# Patient Record
Sex: MALE | Race: WHITE | NOT HISPANIC OR LATINO | Employment: FULL TIME | URBAN - METROPOLITAN AREA
[De-identification: names, ages, dates, MRNs, and addresses within clinical notes are randomized per-mention and may not be internally consistent; named-entity substitution may affect disease eponyms.]

---

## 2023-06-08 ENCOUNTER — OFFICE VISIT (OUTPATIENT)
Dept: PRIMARY CARE | Facility: CLINIC | Age: 68
End: 2023-06-08
Payer: MEDICARE

## 2023-06-08 DIAGNOSIS — H61.23 BILATERAL IMPACTED CERUMEN: Primary | ICD-10-CM

## 2023-06-08 PROCEDURE — 69210 REMOVE IMPACTED EAR WAX UNI: CPT | Performed by: INTERNAL MEDICINE

## 2023-06-08 PROCEDURE — 1036F TOBACCO NON-USER: CPT | Performed by: INTERNAL MEDICINE

## 2023-06-08 NOTE — PROGRESS NOTES
Patient ID: Douglas Cheadle is a 68 y.o. male.    Presents for periodic ear cerumen removal  Initial irrigation procedure performed by Kaitlynn Black MA  Curette removal under direct vision by Jorge Oneill MD    Ear Cerumen Removal    Date/Time: 6/8/2023 10:52 AM    Performed by: Jorge Oneill MD  Authorized by: Jorge Oneill MD    Consent:     Consent obtained:  Verbal    Risks discussed:  Bleeding, pain, TM perforation and incomplete removal  Procedure details:     Location:  L ear and R ear    Procedure type: curette      Procedure outcomes: cerumen removed    Post-procedure details:     Inspection:  Bleeding (Right side-cottonball tamponade)    Hearing quality:  Normal    Procedure completion:  Tolerated    Assessment and plan  Cerumen impaction and removal with irrigation and curette under direct vision  Minor bleeding right canal, applied cottonball tamponade

## 2023-06-30 PROBLEM — H60.90 OTITIS EXTERNAL: Status: RESOLVED | Noted: 2023-06-30 | Resolved: 2023-06-30

## 2023-06-30 PROBLEM — R03.0 WHITE COAT SYNDROME WITHOUT HYPERTENSION: Status: ACTIVE | Noted: 2023-06-30

## 2023-06-30 PROBLEM — H91.90 HEARING DIFFICULTY: Status: ACTIVE | Noted: 2023-06-30

## 2023-06-30 PROBLEM — R00.1 SINUS BRADYCARDIA: Status: ACTIVE | Noted: 2023-06-30

## 2023-06-30 PROBLEM — R09.89 CORONA PHLEBECTATICA PARAPLANTARIS: Status: ACTIVE | Noted: 2023-06-30

## 2023-06-30 PROBLEM — E55.9 VITAMIN D DEFICIENCY: Status: ACTIVE | Noted: 2023-06-30

## 2023-06-30 PROBLEM — H61.23 IMPACTED CERUMEN OF BOTH EARS: Status: RESOLVED | Noted: 2023-06-30 | Resolved: 2023-06-30

## 2023-06-30 PROBLEM — I83.899 BLEEDING FROM VARICOSE VEIN: Status: ACTIVE | Noted: 2023-06-30

## 2023-06-30 RX ORDER — ASCORBIC ACID 500 MG
1 TABLET ORAL DAILY
COMMUNITY

## 2023-06-30 RX ORDER — ASPIRIN 81 MG/1
81 TABLET ORAL DAILY
COMMUNITY

## 2023-06-30 RX ORDER — ACETAMINOPHEN 500 MG
50 TABLET ORAL DAILY
COMMUNITY
Start: 2021-11-04

## 2023-10-19 ENCOUNTER — OFFICE VISIT (OUTPATIENT)
Dept: PRIMARY CARE | Facility: CLINIC | Age: 68
End: 2023-10-19
Payer: MEDICARE

## 2023-10-19 DIAGNOSIS — H60.63 CHRONIC NON-INFECTIVE OTITIS EXTERNA OF BOTH EARS, UNSPECIFIED TYPE: ICD-10-CM

## 2023-10-19 DIAGNOSIS — H61.23 BILATERAL IMPACTED CERUMEN: Primary | ICD-10-CM

## 2023-10-19 PROCEDURE — 3075F SYST BP GE 130 - 139MM HG: CPT | Performed by: INTERNAL MEDICINE

## 2023-10-19 PROCEDURE — 1036F TOBACCO NON-USER: CPT | Performed by: INTERNAL MEDICINE

## 2023-10-19 PROCEDURE — 1159F MED LIST DOCD IN RCRD: CPT | Performed by: INTERNAL MEDICINE

## 2023-10-19 PROCEDURE — 99213 OFFICE O/P EST LOW 20 MIN: CPT | Performed by: INTERNAL MEDICINE

## 2023-10-19 PROCEDURE — 3078F DIAST BP <80 MM HG: CPT | Performed by: INTERNAL MEDICINE

## 2023-10-19 PROCEDURE — 1126F AMNT PAIN NOTED NONE PRSNT: CPT | Performed by: INTERNAL MEDICINE

## 2023-11-09 VITALS — RESPIRATION RATE: 14 BRPM | SYSTOLIC BLOOD PRESSURE: 130 MMHG | DIASTOLIC BLOOD PRESSURE: 78 MMHG | HEART RATE: 72 BPM

## 2023-11-09 PROBLEM — H60.63 CHRONIC NON-INFECTIVE OTITIS EXTERNA OF BOTH EARS: Status: ACTIVE | Noted: 2023-11-09

## 2023-11-09 PROBLEM — H61.23 BILATERAL IMPACTED CERUMEN: Status: ACTIVE | Noted: 2023-06-30

## 2023-11-09 ASSESSMENT — ENCOUNTER SYMPTOMS
RESPIRATORY NEGATIVE: 1
SORE THROAT: 0
CARDIOVASCULAR NEGATIVE: 1
SINUS PRESSURE: 0

## 2023-11-10 NOTE — PROGRESS NOTES
Subjective   Patient ID: Doug Cheadle is a 68 y.o. male who presents for Cerumen Impaction.  HPI  Periodic cerumen disimpaction  No acute health questions concerns or complaints    Review of Systems   HENT:  Positive for ear discharge. Negative for ear pain, hearing loss, sinus pressure and sore throat.    Respiratory: Negative.     Cardiovascular: Negative.        Objective   Physical Exam  Constitutional:       Appearance: Normal appearance.   HENT:      Ears:      Comments: Bilateral ear canal and external ear with cerumen and copious whitish discharge  Nontender, intact hearing, tympanic membranes visualized and normal  allCLEAR after gentle cleansing of external ear and ear canal with cotton tip applicator       Mouth/Throat:      Mouth: Mucous membranes are moist.      Pharynx: Oropharynx is clear.   Cardiovascular:      Rate and Rhythm: Normal rate and regular rhythm.   Pulmonary:      Effort: Pulmonary effort is normal.      Breath sounds: Normal breath sounds.   Lymphadenopathy:      Cervical: No cervical adenopathy.   Neurological:      Mental Status: He is alert.       /78   Pulse 72   Resp 14     Procedure-external ear/tragus and ear canal cleansed of ceruminous debris under direct vision with cotton tip applicator and curette, well-tolerated    Assessment/Plan     External otitis and cerumen impaction relieved  Problem List Items Addressed This Visit    None

## 2024-02-22 ENCOUNTER — OFFICE VISIT (OUTPATIENT)
Dept: PRIMARY CARE | Facility: CLINIC | Age: 69
End: 2024-02-22
Payer: MEDICARE

## 2024-02-22 DIAGNOSIS — H61.23 BILATERAL IMPACTED CERUMEN: ICD-10-CM

## 2024-02-22 DIAGNOSIS — H60.63 CHRONIC NON-INFECTIVE OTITIS EXTERNA OF BOTH EARS, UNSPECIFIED TYPE: Primary | ICD-10-CM

## 2024-02-22 PROCEDURE — 99213 OFFICE O/P EST LOW 20 MIN: CPT | Performed by: INTERNAL MEDICINE

## 2024-02-22 PROCEDURE — 3078F DIAST BP <80 MM HG: CPT | Performed by: INTERNAL MEDICINE

## 2024-02-22 PROCEDURE — 3075F SYST BP GE 130 - 139MM HG: CPT | Performed by: INTERNAL MEDICINE

## 2024-02-22 PROCEDURE — 1126F AMNT PAIN NOTED NONE PRSNT: CPT | Performed by: INTERNAL MEDICINE

## 2024-02-22 PROCEDURE — 1036F TOBACCO NON-USER: CPT | Performed by: INTERNAL MEDICINE

## 2024-02-22 NOTE — PROGRESS NOTES
Subjective   Patient ID: Doug Cheadle is a 69 y.o. male who presents for Follow-up.  HPI  Recurrent earwax impaction  No acute symptoms    Review of Systems   HENT:  Positive for ear discharge. Negative for ear pain.    Respiratory: Negative.     Cardiovascular: Negative.        Objective   Physical Exam  HENT:      Ears:      Comments: Bilateral ears with subtotal cerumen impaction and white exudative debris in canal and external ear, easily cleaned with cotton swab and curette  Cardiovascular:      Rate and Rhythm: Normal rate and regular rhythm.      Pulses: Normal pulses.      Heart sounds: Normal heart sounds.   Pulmonary:      Effort: Pulmonary effort is normal.      Breath sounds: Normal breath sounds.       /78   Pulse 68     Assessment/Plan   Cerumen impaction and external otitis physically decompressed    Problem List Items Addressed This Visit       Bilateral impacted cerumen    Chronic non-infective otitis externa of both ears - Primary

## 2024-03-10 VITALS — SYSTOLIC BLOOD PRESSURE: 134 MMHG | DIASTOLIC BLOOD PRESSURE: 78 MMHG | HEART RATE: 68 BPM

## 2024-03-10 ASSESSMENT — ENCOUNTER SYMPTOMS
RESPIRATORY NEGATIVE: 1
CARDIOVASCULAR NEGATIVE: 1

## 2024-06-13 ENCOUNTER — APPOINTMENT (OUTPATIENT)
Dept: PRIMARY CARE | Facility: CLINIC | Age: 69
End: 2024-06-13
Payer: MEDICARE

## 2024-06-13 DIAGNOSIS — H60.63 CHRONIC NON-INFECTIVE OTITIS EXTERNA OF BOTH EARS, UNSPECIFIED TYPE: Primary | ICD-10-CM

## 2024-06-13 PROCEDURE — 3078F DIAST BP <80 MM HG: CPT | Performed by: INTERNAL MEDICINE

## 2024-06-13 PROCEDURE — 3075F SYST BP GE 130 - 139MM HG: CPT | Performed by: INTERNAL MEDICINE

## 2024-06-13 PROCEDURE — 99213 OFFICE O/P EST LOW 20 MIN: CPT | Performed by: INTERNAL MEDICINE

## 2024-06-13 PROCEDURE — 1036F TOBACCO NON-USER: CPT | Performed by: INTERNAL MEDICINE

## 2024-06-22 VITALS — RESPIRATION RATE: 14 BRPM | SYSTOLIC BLOOD PRESSURE: 132 MMHG | DIASTOLIC BLOOD PRESSURE: 76 MMHG | HEART RATE: 68 BPM

## 2024-06-22 ASSESSMENT — ENCOUNTER SYMPTOMS
RESPIRATORY NEGATIVE: 1
CARDIOVASCULAR NEGATIVE: 1

## 2024-06-22 NOTE — PROGRESS NOTES
Subjective   Patient ID: Doug Cheadle is a 69 y.o. male who presents for Follow-up and Cerumen Impaction.  HPI  Check recurrent earwax impaction  No acute symptoms    Review of Systems   HENT:  Positive for ear discharge. Negative for ear pain and hearing loss.    Respiratory: Negative.     Cardiovascular: Negative.        Objective   Physical Exam  Constitutional:       General: He is not in acute distress.  HENT:      Ears:      Comments: Bilateral ears with subtotal mild cerumen without impaction and white exudative debris in canal and external ear, easily cleaned with cotton swab and curette  Cardiovascular:      Rate and Rhythm: Normal rate and regular rhythm.      Pulses: Normal pulses.      Heart sounds: Normal heart sounds.   Pulmonary:      Effort: Pulmonary effort is normal.      Breath sounds: Normal breath sounds.   Neurological:      Mental Status: He is alert.       /76   Pulse 68   Resp 14     Assessment/Plan   Cerumen accumulation and external otitis physically decompressed, removed and cleansed external ear and canal    Problem List Items Addressed This Visit       Chronic non-infective otitis externa of both ears - Primary

## 2024-10-03 ENCOUNTER — APPOINTMENT (OUTPATIENT)
Dept: PRIMARY CARE | Facility: CLINIC | Age: 69
End: 2024-10-03
Payer: MEDICARE

## 2024-10-03 DIAGNOSIS — H60.63 CHRONIC NON-INFECTIVE OTITIS EXTERNA OF BOTH EARS, UNSPECIFIED TYPE: Primary | ICD-10-CM

## 2024-10-03 DIAGNOSIS — Z23 ENCOUNTER FOR IMMUNIZATION: ICD-10-CM

## 2024-10-03 PROCEDURE — 90662 IIV NO PRSV INCREASED AG IM: CPT | Performed by: INTERNAL MEDICINE

## 2024-10-03 PROCEDURE — 3078F DIAST BP <80 MM HG: CPT | Performed by: INTERNAL MEDICINE

## 2024-10-03 PROCEDURE — 99213 OFFICE O/P EST LOW 20 MIN: CPT | Performed by: INTERNAL MEDICINE

## 2024-10-03 PROCEDURE — G0008 ADMIN INFLUENZA VIRUS VAC: HCPCS | Performed by: INTERNAL MEDICINE

## 2024-10-03 PROCEDURE — 3074F SYST BP LT 130 MM HG: CPT | Performed by: INTERNAL MEDICINE

## 2024-10-13 VITALS — SYSTOLIC BLOOD PRESSURE: 126 MMHG | DIASTOLIC BLOOD PRESSURE: 74 MMHG

## 2024-10-13 PROBLEM — Z23 ENCOUNTER FOR IMMUNIZATION: Status: ACTIVE | Noted: 2024-10-13

## 2024-10-13 ASSESSMENT — ENCOUNTER SYMPTOMS
CARDIOVASCULAR NEGATIVE: 1
NEUROLOGICAL NEGATIVE: 1
RESPIRATORY NEGATIVE: 1

## 2024-10-13 NOTE — PROGRESS NOTES
Subjective   Patient ID: Doug Cheadle is a 69 y.o. male who presents for Follow-up and Cerumen Impaction.  HPI  Routine follow-up for ear cerumen cleaning  No acute ear symptoms    Review of Systems   HENT:  Positive for ear discharge. Negative for ear pain and hearing loss.    Respiratory: Negative.     Cardiovascular: Negative.    Neurological: Negative.        Objective   Physical Exam  Constitutional:       General: He is not in acute distress.  HENT:      Ears:      Comments: Bilateral ears with subtotal mild cerumen without impaction and white exudative debris in canal and external ear, easily cleaned with cotton swab and curette  Cardiovascular:      Rate and Rhythm: Normal rate and regular rhythm.      Pulses: Normal pulses.      Heart sounds: Normal heart sounds.   Pulmonary:      Effort: Pulmonary effort is normal.      Breath sounds: Normal breath sounds.   Neurological:      Mental Status: He is alert.       /74       Procedure  Bilateral cerumen removal copious from external ear and mild ear canal bilaterally, well-tolerated    Assessment/Plan     Cerumen removal successful and well-tolerated  Administered seasonal flu shot, immunizations reviewed  Problem List Items Addressed This Visit       Chronic non-infective otitis externa of both ears - Primary    Encounter for immunization

## 2024-12-09 ENCOUNTER — APPOINTMENT (OUTPATIENT)
Dept: PRIMARY CARE | Facility: CLINIC | Age: 69
End: 2024-12-09
Payer: MEDICARE

## 2025-01-20 ENCOUNTER — LAB (OUTPATIENT)
Dept: LAB | Facility: LAB | Age: 70
End: 2025-01-20
Payer: MEDICARE

## 2025-01-20 ENCOUNTER — APPOINTMENT (OUTPATIENT)
Dept: PRIMARY CARE | Facility: CLINIC | Age: 70
End: 2025-01-20
Payer: MEDICARE

## 2025-01-20 VITALS
DIASTOLIC BLOOD PRESSURE: 70 MMHG | HEIGHT: 65 IN | WEIGHT: 142 LBS | RESPIRATION RATE: 14 BRPM | SYSTOLIC BLOOD PRESSURE: 140 MMHG | BODY MASS INDEX: 23.66 KG/M2 | HEART RATE: 70 BPM

## 2025-01-20 DIAGNOSIS — Z12.5 SCREENING FOR MALIGNANT NEOPLASM OF PROSTATE: ICD-10-CM

## 2025-01-20 DIAGNOSIS — R00.1 SINUS BRADYCARDIA: ICD-10-CM

## 2025-01-20 DIAGNOSIS — Z00.00 ROUTINE GENERAL MEDICAL EXAMINATION AT A HEALTH CARE FACILITY: Primary | ICD-10-CM

## 2025-01-20 DIAGNOSIS — H60.63 CHRONIC NON-INFECTIVE OTITIS EXTERNA OF BOTH EARS, UNSPECIFIED TYPE: ICD-10-CM

## 2025-01-20 DIAGNOSIS — E78.5 BORDERLINE HYPERLIPIDEMIA: ICD-10-CM

## 2025-01-20 DIAGNOSIS — Z11.59 ENCOUNTER FOR HEPATITIS C SCREENING TEST FOR LOW RISK PATIENT: ICD-10-CM

## 2025-01-20 DIAGNOSIS — R03.0 WHITE COAT SYNDROME WITHOUT HYPERTENSION: ICD-10-CM

## 2025-01-20 DIAGNOSIS — Z00.00 ROUTINE GENERAL MEDICAL EXAMINATION AT A HEALTH CARE FACILITY: ICD-10-CM

## 2025-01-20 LAB
ALBUMIN SERPL BCP-MCNC: 4.7 G/DL (ref 3.4–5)
ALT SERPL W P-5'-P-CCNC: 18 U/L (ref 10–52)
ANION GAP SERPL CALC-SCNC: 15 MMOL/L (ref 10–20)
APPEARANCE UR: CLEAR
AST SERPL W P-5'-P-CCNC: 18 U/L (ref 9–39)
BASOPHILS # BLD AUTO: 0.02 X10*3/UL (ref 0–0.1)
BASOPHILS NFR BLD AUTO: 0.3 %
BILIRUB UR STRIP.AUTO-MCNC: NEGATIVE MG/DL
BUN SERPL-MCNC: 21 MG/DL (ref 6–23)
CALCIUM SERPL-MCNC: 9.2 MG/DL (ref 8.6–10.6)
CHLORIDE SERPL-SCNC: 102 MMOL/L (ref 98–107)
CHOLEST SERPL-MCNC: 182 MG/DL (ref 0–199)
CHOLESTEROL/HDL RATIO: 3.4
CO2 SERPL-SCNC: 29 MMOL/L (ref 21–32)
COLOR UR: ABNORMAL
CREAT SERPL-MCNC: 0.88 MG/DL (ref 0.5–1.3)
EGFRCR SERPLBLD CKD-EPI 2021: >90 ML/MIN/1.73M*2
EOSINOPHIL # BLD AUTO: 0.02 X10*3/UL (ref 0–0.7)
EOSINOPHIL NFR BLD AUTO: 0.3 %
ERYTHROCYTE [DISTWIDTH] IN BLOOD BY AUTOMATED COUNT: 12.8 % (ref 11.5–14.5)
GLUCOSE SERPL-MCNC: 90 MG/DL (ref 74–99)
GLUCOSE UR STRIP.AUTO-MCNC: NORMAL MG/DL
HCT VFR BLD AUTO: 44.7 % (ref 41–52)
HCV AB SER QL: NONREACTIVE
HDLC SERPL-MCNC: 54.2 MG/DL
HGB BLD-MCNC: 14.6 G/DL (ref 13.5–17.5)
IMM GRANULOCYTES # BLD AUTO: 0.02 X10*3/UL (ref 0–0.7)
IMM GRANULOCYTES NFR BLD AUTO: 0.3 % (ref 0–0.9)
KETONES UR STRIP.AUTO-MCNC: ABNORMAL MG/DL
LDLC SERPL CALC-MCNC: 115 MG/DL
LEUKOCYTE ESTERASE UR QL STRIP.AUTO: NEGATIVE
LYMPHOCYTES # BLD AUTO: 0.96 X10*3/UL (ref 1.2–4.8)
LYMPHOCYTES NFR BLD AUTO: 14.7 %
MCH RBC QN AUTO: 29.4 PG (ref 26–34)
MCHC RBC AUTO-ENTMCNC: 32.7 G/DL (ref 32–36)
MCV RBC AUTO: 90 FL (ref 80–100)
MONOCYTES # BLD AUTO: 0.43 X10*3/UL (ref 0.1–1)
MONOCYTES NFR BLD AUTO: 6.6 %
NEUTROPHILS # BLD AUTO: 5.08 X10*3/UL (ref 1.2–7.7)
NEUTROPHILS NFR BLD AUTO: 77.8 %
NITRITE UR QL STRIP.AUTO: NEGATIVE
NON HDL CHOLESTEROL: 128 MG/DL (ref 0–149)
NRBC BLD-RTO: 0 /100 WBCS (ref 0–0)
PH UR STRIP.AUTO: 6.5 [PH]
PHOSPHATE SERPL-MCNC: 3.3 MG/DL (ref 2.5–4.9)
PLATELET # BLD AUTO: 219 X10*3/UL (ref 150–450)
POTASSIUM SERPL-SCNC: 4.4 MMOL/L (ref 3.5–5.3)
PROT UR STRIP.AUTO-MCNC: NEGATIVE MG/DL
PSA SERPL-MCNC: 2.31 NG/ML
RBC # BLD AUTO: 4.97 X10*6/UL (ref 4.5–5.9)
RBC # UR STRIP.AUTO: NEGATIVE /UL
SODIUM SERPL-SCNC: 142 MMOL/L (ref 136–145)
SP GR UR STRIP.AUTO: 1.02
TRIGL SERPL-MCNC: 62 MG/DL (ref 0–149)
TSH SERPL-ACNC: 1.4 MIU/L (ref 0.44–3.98)
UROBILINOGEN UR STRIP.AUTO-MCNC: NORMAL MG/DL
VLDL: 12 MG/DL (ref 0–40)
WBC # BLD AUTO: 6.5 X10*3/UL (ref 4.4–11.3)

## 2025-01-20 PROCEDURE — 1170F FXNL STATUS ASSESSED: CPT | Performed by: INTERNAL MEDICINE

## 2025-01-20 PROCEDURE — G0439 PPPS, SUBSEQ VISIT: HCPCS | Performed by: INTERNAL MEDICINE

## 2025-01-20 PROCEDURE — G0103 PSA SCREENING: HCPCS

## 2025-01-20 PROCEDURE — 84460 ALANINE AMINO (ALT) (SGPT): CPT

## 2025-01-20 PROCEDURE — 1160F RVW MEDS BY RX/DR IN RCRD: CPT | Performed by: INTERNAL MEDICINE

## 2025-01-20 PROCEDURE — 1159F MED LIST DOCD IN RCRD: CPT | Performed by: INTERNAL MEDICINE

## 2025-01-20 PROCEDURE — 85025 COMPLETE CBC W/AUTO DIFF WBC: CPT

## 2025-01-20 PROCEDURE — 80069 RENAL FUNCTION PANEL: CPT

## 2025-01-20 PROCEDURE — G0472 HEP C SCREEN HIGH RISK/OTHER: HCPCS

## 2025-01-20 PROCEDURE — 3078F DIAST BP <80 MM HG: CPT | Performed by: INTERNAL MEDICINE

## 2025-01-20 PROCEDURE — 84443 ASSAY THYROID STIM HORMONE: CPT

## 2025-01-20 PROCEDURE — 1123F ACP DISCUSS/DSCN MKR DOCD: CPT | Performed by: INTERNAL MEDICINE

## 2025-01-20 PROCEDURE — 80061 LIPID PANEL: CPT

## 2025-01-20 PROCEDURE — 3077F SYST BP >= 140 MM HG: CPT | Performed by: INTERNAL MEDICINE

## 2025-01-20 PROCEDURE — 1036F TOBACCO NON-USER: CPT | Performed by: INTERNAL MEDICINE

## 2025-01-20 PROCEDURE — 84450 TRANSFERASE (AST) (SGOT): CPT

## 2025-01-20 PROCEDURE — 1158F ADVNC CARE PLAN TLK DOCD: CPT | Performed by: INTERNAL MEDICINE

## 2025-01-20 PROCEDURE — 99213 OFFICE O/P EST LOW 20 MIN: CPT | Performed by: INTERNAL MEDICINE

## 2025-01-20 PROCEDURE — 93000 ELECTROCARDIOGRAM COMPLETE: CPT | Performed by: INTERNAL MEDICINE

## 2025-01-20 PROCEDURE — 3008F BODY MASS INDEX DOCD: CPT | Performed by: INTERNAL MEDICINE

## 2025-01-20 PROCEDURE — 81003 URINALYSIS AUTO W/O SCOPE: CPT

## 2025-01-20 NOTE — PROGRESS NOTES
Subjective   Reason for Visit: Doug Cheadle is an 69 y.o. male here for a Medicare Wellness visit.               HPI  Wax tinnitus     Patient Care Team:  Jorge Oneill MD as PCP - General  Jorge Oneill MD as PCP - [facility]ributed [facility]     Review of Systems    Objective   Vitals:  There were no vitals taken for this visit.      Physical Exam    Assessment & Plan  White coat syndrome without hypertension    Orders:  •  ECG 12 lead (Clinic Performed)  •  Renal Function Panel; Future  •  Lipid Panel; Future  •  Alanine Aminotransferase; Future  •  Aspartate Aminotransferase; Future  •  TSH with reflex to Free T4 if abnormal; Future  •  CBC and Auto Differential; Future  •  Urinalysis with Reflex Microscopic; Future    Routine general medical examination at a health care facility    Orders:  •  Hepatitis C Antibody; Future  •  Renal Function Panel; Future  •  Lipid Panel; Future  •  Alanine Aminotransferase; Future  •  Aspartate Aminotransferase; Future  •  Prostate Specific Antigen, Screen; Future  •  TSH with reflex to Free T4 if abnormal; Future  •  CBC and Auto Differential; Future  •  Urinalysis with Reflex Microscopic; Future    Chronic non-infective otitis externa of both ears, unspecified type         Sinus bradycardia    Orders:  •  TSH with reflex to Free T4 if abnormal; Future  •  CBC and Auto Differential; Future    Encounter for hepatitis C screening test for low risk patient    Orders:  •  Hepatitis C Antibody; Future    Screening for malignant neoplasm of prostate    Orders:  •  Prostate Specific Antigen, Screen; Future    Borderline hyperlipidemia    Orders:  •  Lipid Panel; Future         {AWV Counseling (Optional):54939}      distress.     Appearance: Normal appearance. He is normal weight.   HENT:      Head: Normocephalic.      Right Ear: Hearing, tympanic membrane and ear canal normal.      Left Ear: Hearing, tympanic membrane and ear canal normal.      Ears:      Comments: Speech and finger rub intact  Modest cerumen external ear and canal, nonocclusive, clear after removal     Nose: Nose normal.      Mouth/Throat:      Mouth: Mucous membranes are moist.      Pharynx: Oropharynx is clear.   Eyes:      General: No scleral icterus.     Extraocular Movements: Extraocular movements intact.      Conjunctiva/sclera: Conjunctivae normal.   Neck:      Thyroid: No thyromegaly.      Vascular: No carotid bruit or JVD.      Comments: No JVD, thyromegaly; normal carotid upstroke  Cardiovascular:      Rate and Rhythm: Normal rate and regular rhythm.      Pulses: Normal pulses.      Heart sounds: Normal heart sounds. No murmur heard.  Pulmonary:      Effort: Pulmonary effort is normal.      Breath sounds: Normal breath sounds. No wheezing, rhonchi or rales.   Abdominal:      General: Abdomen is flat. Bowel sounds are normal. There is no distension.      Palpations: Abdomen is soft. There is no mass.      Tenderness: There is no abdominal tenderness. There is no right CVA tenderness, left CVA tenderness or guarding.      Hernia: No hernia is present.   Genitourinary:     Penis: Normal.       Testes: Normal.      Prostate: Normal.      Rectum: Normal.   Musculoskeletal:         General: Normal range of motion.      Cervical back: Full passive range of motion without pain and normal range of motion. No tenderness.      Right lower leg: No edema.      Left lower leg: No edema.      Comments: Joints F ROM, T0 L0 S0   Lymphadenopathy:      Cervical: No cervical adenopathy.   Skin:     General: Skin is warm.      Capillary Refill: Capillary refill takes less than 2 seconds.      Findings: No lesion or rash.      Comments: Few keratosis   Neurological:       General: No focal deficit present.      Mental Status: He is alert and oriented to person, place, and time.      Cranial Nerves: No cranial nerve deficit.      Sensory: No sensory deficit.      Motor: No weakness.      Coordination: Coordination normal.      Gait: Gait normal.      Deep Tendon Reflexes: Reflexes normal.      Comments: Right handed   Psychiatric:         Mood and Affect: Mood normal.         Behavior: Behavior normal.         Thought Content: Thought content normal.     Data  ECG normal sinus rhythm 1/20/2025  Lab 12/14/22 reviewed satisfactory  December 2020 reviewed, satisfactory Coronary artery calcium CT score January 2019 equals 9   External ear culture positive fungus October 2017  Colonoscopy December 2008     Assessment & Plan  White coat syndrome without hypertension    Orders:    ECG 12 lead (Clinic Performed)    Renal Function Panel; Future    Lipid Panel; Future    Alanine Aminotransferase; Future    Aspartate Aminotransferase; Future    TSH with reflex to Free T4 if abnormal; Future    CBC and Auto Differential; Future    Urinalysis with Reflex Microscopic; Future    Routine general medical examination at a health care facility    Orders:    Hepatitis C Antibody; Future    Renal Function Panel; Future    Lipid Panel; Future    Alanine Aminotransferase; Future    Aspartate Aminotransferase; Future    Prostate Specific Antigen, Screen; Future    TSH with reflex to Free T4 if abnormal; Future    CBC and Auto Differential; Future    Urinalysis with Reflex Microscopic; Future    Chronic non-infective otitis externa of both ears, unspecified type         Sinus bradycardia    Orders:    TSH with reflex to Free T4 if abnormal; Future    CBC and Auto Differential; Future    Encounter for hepatitis C screening test for low risk patient    Orders:    Hepatitis C Antibody; Future    Screening for malignant neoplasm of prostate    Orders:    Prostate Specific Antigen, Screen; Future    Borderline  hyperlipidemia    Orders:    Lipid Panel; Future    Overall you are in good health today; age and gender appropriate wellness services reviewed  You have no clinical evidence of active heart disease and your cardiac risk factors are satisfactory  Coronary artery calcium CT score was negligible in January 2019, associated with lowest risk of coronary artery disease  Age and gender appropriate cancer screening prevention will be up-to-date with a colon cancer screen  Immunizations reviewed consider RSV vaccine  Cerumen impaction/chronic otitis externa-stable  Dry skin-moisturizers, relief pressure points, use emery board for thickened skin callus

## 2025-01-20 NOTE — ASSESSMENT & PLAN NOTE
Orders:    ECG 12 lead (Clinic Performed)    Renal Function Panel; Future    Lipid Panel; Future    Alanine Aminotransferase; Future    Aspartate Aminotransferase; Future    TSH with reflex to Free T4 if abnormal; Future    CBC and Auto Differential; Future    Urinalysis with Reflex Microscopic; Future

## 2025-01-20 NOTE — ASSESSMENT & PLAN NOTE
Orders:    TSH with reflex to Free T4 if abnormal; Future    CBC and Auto Differential; Future

## 2025-01-20 NOTE — PATIENT INSTRUCTIONS
Overall you are in good health today; age and gender appropriate wellness services reviewed  You have no clinical evidence of active heart disease and your cardiac risk factors are satisfactory  Coronary artery calcium CT score was negligible in January 2019, associated with lowest risk of coronary artery disease  Age and gender appropriate cancer screening prevention will be up-to-date with a colon cancer screen  Immunizations reviewed consider RSV vaccine  Cerumen impaction/chronic otitis externa-stable  Dry skin-moisturizers, relief pressure points, use emery board for thickened skin callus

## 2025-02-13 ENCOUNTER — APPOINTMENT (OUTPATIENT)
Dept: PRIMARY CARE | Facility: CLINIC | Age: 70
End: 2025-02-13
Payer: MEDICARE

## 2025-02-20 ENCOUNTER — APPOINTMENT (OUTPATIENT)
Dept: PRIMARY CARE | Facility: CLINIC | Age: 70
End: 2025-02-20
Payer: MEDICARE

## 2025-02-20 DIAGNOSIS — H60.313 CHRONIC DIFFUSE OTITIS EXTERNA OF BOTH EARS: ICD-10-CM

## 2025-02-20 DIAGNOSIS — Z12.11 SCREEN FOR COLON CANCER: ICD-10-CM

## 2025-02-20 DIAGNOSIS — H61.23 EXCESSIVE EAR WAX, BILATERAL: Primary | ICD-10-CM

## 2025-02-20 DIAGNOSIS — Z01.10 ENCOUNTER FOR HEARING EXAMINATION WITHOUT ABNORMAL FINDINGS: ICD-10-CM

## 2025-02-20 PROCEDURE — 99213 OFFICE O/P EST LOW 20 MIN: CPT | Performed by: INTERNAL MEDICINE

## 2025-02-20 PROCEDURE — 3079F DIAST BP 80-89 MM HG: CPT | Performed by: INTERNAL MEDICINE

## 2025-02-20 PROCEDURE — 3075F SYST BP GE 130 - 139MM HG: CPT | Performed by: INTERNAL MEDICINE

## 2025-02-20 PROCEDURE — 1036F TOBACCO NON-USER: CPT | Performed by: INTERNAL MEDICINE

## 2025-02-23 VITALS — SYSTOLIC BLOOD PRESSURE: 136 MMHG | DIASTOLIC BLOOD PRESSURE: 80 MMHG

## 2025-02-23 PROBLEM — Z11.59 ENCOUNTER FOR HEPATITIS C SCREENING TEST FOR LOW RISK PATIENT: Status: ACTIVE | Noted: 2025-02-23

## 2025-02-23 PROBLEM — H60.90 OTITIS EXTERNA: Status: ACTIVE | Noted: 2023-06-30

## 2025-02-23 PROBLEM — Z12.11 SCREEN FOR COLON CANCER: Status: ACTIVE | Noted: 2025-02-23

## 2025-02-23 PROBLEM — H61.20 IMPACTED CERUMEN: Status: ACTIVE | Noted: 2025-02-23

## 2025-02-23 PROBLEM — Z01.10 ENCOUNTER FOR HEARING EXAMINATION WITHOUT ABNORMAL FINDINGS: Status: ACTIVE | Noted: 2025-02-23

## 2025-02-23 PROBLEM — Z12.5 SCREENING FOR MALIGNANT NEOPLASM OF PROSTATE: Status: ACTIVE | Noted: 2025-02-23

## 2025-02-23 PROBLEM — H61.23 EXCESSIVE EAR WAX, BILATERAL: Status: ACTIVE | Noted: 2025-02-23

## 2025-02-23 PROBLEM — R09.89 LABILE HYPERTENSION DUE TO CLINICAL ENVIRONMENT: Status: ACTIVE | Noted: 2023-06-30

## 2025-02-23 ASSESSMENT — ENCOUNTER SYMPTOMS
FATIGUE: 0
APNEA: 0
TREMORS: 0
SLEEP DISTURBANCE: 0
DYSURIA: 0
FEVER: 0
VOMITING: 0
DECREASED CONCENTRATION: 0
SORE THROAT: 0
ARTHRALGIAS: 0
TROUBLE SWALLOWING: 0
COUGH: 0
LIGHT-HEADEDNESS: 0
BACK PAIN: 0
HEMATURIA: 0
HEADACHES: 0
PALPITATIONS: 0
ENDOCRINE NEGATIVE: 1
RESPIRATORY NEGATIVE: 1
DIARRHEA: 0
CONFUSION: 0
CARDIOVASCULAR NEGATIVE: 1
BRUISES/BLEEDS EASILY: 0
CONSTIPATION: 0
RESPIRATORY NEGATIVE: 1
DYSPHORIC MOOD: 0
POLYDIPSIA: 0
WEAKNESS: 0
EYES NEGATIVE: 1
NERVOUS/ANXIOUS: 0
MUSCULOSKELETAL NEGATIVE: 1
FREQUENCY: 0
CONSTITUTIONAL NEGATIVE: 1
GASTROINTESTINAL NEGATIVE: 1
UNEXPECTED WEIGHT CHANGE: 0
SPEECH DIFFICULTY: 0
DIZZINESS: 0
VOICE CHANGE: 0
POLYPHAGIA: 0
SHORTNESS OF BREATH: 0
NAUSEA: 0
WOUND: 0
NUMBNESS: 0
ABDOMINAL PAIN: 0

## 2025-02-23 ASSESSMENT — PATIENT HEALTH QUESTIONNAIRE - PHQ9
SUM OF ALL RESPONSES TO PHQ9 QUESTIONS 1 AND 2: 0
2. FEELING DOWN, DEPRESSED OR HOPELESS: NOT AT ALL
1. LITTLE INTEREST OR PLEASURE IN DOING THINGS: NOT AT ALL

## 2025-02-23 ASSESSMENT — ACTIVITIES OF DAILY LIVING (ADL)
BATHING: INDEPENDENT
MANAGING_FINANCES: INDEPENDENT
TAKING_MEDICATION: INDEPENDENT
DOING_HOUSEWORK: INDEPENDENT
GROCERY_SHOPPING: INDEPENDENT
DRESSING: INDEPENDENT

## 2025-02-23 NOTE — PROGRESS NOTES
Subjective   Patient ID: Doug Cheadle is a 69 y.o. male who presents for Follow-up.  HPI  Check ears for recurrent/chronic cerumen  No acute ear symptoms  Review lab reports    Review of Systems   Constitutional: Negative.    HENT:  Negative for hearing loss.    Respiratory: Negative.     Cardiovascular: Negative.    Gastrointestinal: Negative.        Objective   Physical Exam  HENT:      Right Ear: Tympanic membrane, ear canal and external ear normal.      Left Ear: Tympanic membrane, ear canal and external ear normal.      Ears:      Comments: Moderate ceruminous debris adherent to external ear, removed with cotton swab applicator until clear bilateral  Cardiovascular:      Rate and Rhythm: Normal rate and regular rhythm.      Heart sounds: Normal heart sounds.   Pulmonary:      Effort: Pulmonary effort is normal.      Breath sounds: Normal breath sounds.   Neurological:      Mental Status: He is alert.   Psychiatric:         Mood and Affect: Mood normal.       /80     Data  Lab 1/20/2025 UA ketones otherwise normal    Assessment/Plan   Overall doing well  Removed external cerumen with cotton tip applicator  Referral audiology    Problem List Items Addressed This Visit       Otitis externa    Encounter for hearing examination without abnormal findings    Relevant Orders    Referral to Audiology    Encounter for hepatitis C screening test for low risk patient    Excessive ear wax, bilateral - Primary

## 2025-03-10 NOTE — PROGRESS NOTES
"AUDIOLOGY ADULT AUDIOMETRIC EVALUATION      Name:  Doug Cheadle   :  1955  Age:  70 y.o.  Date of Evaluation:  3/11/2025    Time: 8285-6614    IMPRESSIONS     Right Ear: Hearing sensitivity within normal limits 125-1000 Hz falling to a mild sloping to moderate sensorineural hearing loss 8442-3853 Hz.  Left Ear: Hearing sensitivity within normal limits 125-500 Hz falling to a mild sloping to moderate sensorineural hearing loss 9126-5771 Hz. NOTE: asymmetry present at 6756-5511 Hz.  Word recognition in quiet is excellent in both ears.  Tympanometry indicates normal middle ear pressure and tympanic membrane mobility in both ears.       RECOMMENDATIONS     Recommend evaluation with ENT due to asymmetric hearing loss, left ear worse.  Continue medical follow up with primary care provider and/or Ears Nose and Throat (ENT) provider as recommended.  Annual hearing evaluations to monitor hearing levels. Return sooner if a change in hearing/tinnitus is noted.  Avoid exposure to loud sounds by moving away from the noise, turning down the volume, or wearing proper hearing protection correctly.    HISTORY     Reason for visit:  Mr. Cheadle is seen today for an initial audiologic evaluation at the request of Jorge Oneill MD. History obtained from patient report and chart review.     Change in Hearing: yes, reports subjectively poorer hearing in the left ear. This has been the case for many years.  Tinnitus: yes, worse in the left ear.  He feels like the tinnitus was worse in the winter months but is now improving a bit.    Otalgia: denied  Aural Pressure/Fullness: denied  Otorrhea: denied  Dizziness: denied  History of Ear Surgeries: denied  History of Noise Exposure: yes, noise in loud bars  Hearing Aid Use: None    EVALUATION     See scanned audiogram in \"Media\"     TEST RESULTS     Otoscopic Evaluation:  Right Ear: Ear canal clear, tympanic membrane visualized. Wet cerumen noted in the canal opening.  Left Ear: Ear " canal clear, tympanic membrane visualized. Wet cerumen noted in the canal opening.    Tympanometry (226 Hz):  Right Ear: Type A, middle ear pressure and tympanic membrane compliance within normal limits.   Left Ear: Type A, middle ear pressure and tympanic membrane compliance within normal limits.     Acoustic Reflexes:   Right Ear: (Ipsilateral) Responses present at 500-4000 Hz.  Left Ear: (Ipsilateral) Responses present at 500-4000 Hz.    Distortion Product Otoacoustic Emissions:  Right Ear: Did not test.  Left Ear:  Did not test.  Present OAEs suggest normal or near cochlear outer hair cell function for corresponding frequency region(s). Absent OAEs with normal middle ear function can be consistent with some degree of hearing loss. Assessment of cochlear outer hair cell function may be impacted by outer or middle ear function.    Test technique:  Pure Tone Audiometry via insert earphones  Reliability: Good    Pure Tone Audiometry:    Right Ear: Hearing sensitivity within normal limits 125-1000 Hz falling to a mild sloping to moderate sensorineural hearing loss 3859-3832 Hz.  Left Ear: Hearing sensitivity within normal limits 125-500 Hz falling to a mild sloping to moderate sensorineural hearing loss 2485-3231 Hz. NOTE: asymmetry present at 2893-9341 Hz.    Speech Audiometry:   Right Ear:  Speech Reception Threshold (SRT) was obtained at 10 dB HL. Word Recognition scores were excellent (96%) in quiet when words were presented at 50 dB HL. These results are based on [facility] Auditory Test No.6 (NU-6) (N=25).   Left Ear:  Speech Reception Threshold (SRT) was obtained at 25 dB HL. Word Recognition scores were excellent (96%) in quiet when words were presented at 65 dB HL. These results are based on [facility] Auditory Test No.6 (NU-6) (N=25).     Comparison of today's results with previous test results: No previous results available.         PATIENT EDUCATION     Discussed results and  recommendations with Mr. Cheadle. Questions were addressed and the patient was encouraged to contact our department at [phone] should concerns arise.       James Jones, CCC-A  Clinical Audiologist    Degree of   Hearing Sensitivity dB Range   Within Normal Limits (WNL) 0 - 20   Slight 25   Mild 26 - 40   Moderate 41 - 55   Moderately-Severe 56 - 70   Severe 71 - 90   Profound 91 +     Key   CHL Conductive Hearing Loss   ECV Ear Canal Volume   HA Hearing Aid   NIHL Noise-Induced Hearing Loss   PTA Pure Tone Average   SNHL Sensorineural Hearing Loss   TM Tympanic Membrane   WNL Within Normal Limits

## 2025-03-11 ENCOUNTER — CLINICAL SUPPORT (OUTPATIENT)
Dept: AUDIOLOGY | Facility: CLINIC | Age: 70
End: 2025-03-11
Payer: MEDICARE

## 2025-03-11 DIAGNOSIS — H90.3 ASYMMETRIC SNHL (SENSORINEURAL HEARING LOSS): Primary | ICD-10-CM

## 2025-03-11 DIAGNOSIS — H93.13 TINNITUS OF BOTH EARS: ICD-10-CM

## 2025-03-11 PROCEDURE — 92550 TYMPANOMETRY & REFLEX THRESH: CPT | Mod: 52 | Performed by: AUDIOLOGIST

## 2025-03-11 PROCEDURE — 92557 COMPREHENSIVE HEARING TEST: CPT | Performed by: AUDIOLOGIST

## 2025-03-20 ENCOUNTER — APPOINTMENT (OUTPATIENT)
Dept: OTOLARYNGOLOGY | Facility: CLINIC | Age: 70
End: 2025-03-20
Payer: MEDICARE

## 2025-03-20 VITALS — BODY MASS INDEX: 23.7 KG/M2 | WEIGHT: 151 LBS | HEIGHT: 67 IN

## 2025-03-20 DIAGNOSIS — H61.22 IMPACTED CERUMEN OF LEFT EAR: ICD-10-CM

## 2025-03-20 DIAGNOSIS — H93.12 TINNITUS OF LEFT EAR: ICD-10-CM

## 2025-03-20 DIAGNOSIS — H90.3 SENSORINEURAL HEARING LOSS (SNHL) OF BOTH EARS: Primary | ICD-10-CM

## 2025-03-20 DIAGNOSIS — H91.8X3 ASYMMETRICAL HEARING LOSS: ICD-10-CM

## 2025-03-20 PROCEDURE — 99204 OFFICE O/P NEW MOD 45 MIN: CPT | Performed by: NURSE PRACTITIONER

## 2025-03-20 PROCEDURE — 3008F BODY MASS INDEX DOCD: CPT | Performed by: NURSE PRACTITIONER

## 2025-03-20 PROCEDURE — 1160F RVW MEDS BY RX/DR IN RCRD: CPT | Performed by: NURSE PRACTITIONER

## 2025-03-20 PROCEDURE — 1036F TOBACCO NON-USER: CPT | Performed by: NURSE PRACTITIONER

## 2025-03-20 PROCEDURE — 1159F MED LIST DOCD IN RCRD: CPT | Performed by: NURSE PRACTITIONER

## 2025-03-20 ASSESSMENT — PATIENT HEALTH QUESTIONNAIRE - PHQ9
1. LITTLE INTEREST OR PLEASURE IN DOING THINGS: NOT AT ALL
2. FEELING DOWN, DEPRESSED OR HOPELESS: NOT AT ALL
SUM OF ALL RESPONSES TO PHQ9 QUESTIONS 1 AND 2: 0

## 2025-03-20 NOTE — PROGRESS NOTES
Subjective   Patient ID: Doug Cheadle is a 70 y.o. male who presents for Follow-up (From audiology ), Hearing Loss, and Tinnitus (During winter).  Hearing Loss      This patient is referred for evaluation of asymmetrical hearing loss.  The patient is not accompanied by a family member.   When asked about ear pain, hearing loss, discharge from ear or tinnitus in the affected ear, the patient admits to left greater than right hearing loss for many years as well as low-grade left-sided tinnitus which significantly increased in severity in January/February 2025.  The tinnitus has returned to its baseline.  When asked about a significant past otological history including history of prior ear surgery, noise exposure, exposure to ototoxic drugs or agents, and/or family history of hearing loss, the patient admits to recreational noise exposure.  Review of Systems   HENT:  Positive for hearing loss.      A comprehensive or 10 points review of the patient's constitutional, neurological, HEENT, pulmonary, cardiovascular and genito-urinary systems showed only those mentioned in history of present illness.    Objective   Physical Exam  Constitutional: no fever, chills, weight loss or weight gain   General appearance: Appears well, well-nourished, well groomed. No acute distress.   Communication: Normal communication   Psychiatric: Oriented to person, place and time. Normal mood and affect.   Neurologic: Cranial nerves II-XII grossly intact and symmetric bilaterally.   Head and Face:   Head: Atraumatic with no masses, lesions or scarring.   Face: Normal symmetry, no paralysis, synkinesis or facial tic. No scars or deformities.   TMJs: Normal, no trismus, crepitus, or tenderness noted.  Eyes: Conjunctiva not edematous or erythematous   Ears: External inspection of ears with no deformity, scars or masses.  Right canal clear.  TM intact.  No effusion or retraction noted.  Left canal with cerumen impaction.     Neck: Normal  appearing, symmetric, trachea midline.   Cardiovascular: Examination of peripheral vascular system shows no clubbing or cyanosis.   Respiratory: No respiratory distress increased work of breathing. Inspection of the chest with symmetric chest expansion and normal respiratory effort.   Skin: No rashes in the head or neck  Bedside occulomotor function assessment for ocular pursuits and saccades, spontaneous nystagmus,  positional and postural testing (RombergGonzalo) is normal.      My interpretation of the audiogram done 3/11/2025 is right side with normal hearing through 2000 Hz sloping to mild/moderate sensorineural hearing loss with excellent word recognition score and normal tympanogram.  Left side with normal hearing through 1000 Hz sloping to mild/moderate sensorineural hearing loss with excellent word recognition score and normal tympanogram.  Left greater than right sensorineural asymmetry is most significant at 4000 Hz.    Assessment/Plan     This patient presents for initial evaluation of acute acquired left-sided cerumen impaction as well as chronic bilateral/asymmetrical sensorineural hearing loss and left subjective tinnitus.     Reassurance given that otologic exam is normal after cleaning and balance exam today is also normal.  Audiogram was reviewed in detail.  We discussed possible etiologies of asymmetry including viral inflammation, vascular insufficiency, noise exposure, acoustic neuroma.  I recommended MRI IAC with and without contrast to further evaluate.  I am happy to call patient to discuss results.  We discussed that he has a significant amount of hearing that is in the normal range.  Therefore, he has a borderline candidate for hearing amplification.  At this time, I did not recommend hearing aids.  Otherwise, I recommended yearly audiograms.  Patient is in agreement with the plan.  All questions were answered to patient's satisfaction.    This note was created using speech recognition  transcription software. Despite proofreading, several typographical errors might be present that might affect the meaning of the content. Please call with any questions.    Patient ID: Doug Cheadle is a 70 y.o. male.    Ear cerumen removal    Date/Time: 3/20/2025 12:11 PM    Performed by: DIEUDONNE Schwartz  Authorized by: DIEUDONNE Schwartz    Consent:     Consent obtained:  Verbal    Consent given by:  Patient    Risks discussed:  Pain    Alternatives discussed:  No treatment  Procedure details:     Location:  L ear    Procedure type comment:  Suction    Procedure outcomes: cerumen removed    Post-procedure details:     Inspection:  No bleeding and ear canal clear    Hearing quality:  Improved    Procedure completion:  Tolerated well, no immediate complications      DIEUDONNE Schwartz 03/20/25 12:06 PM

## 2025-03-25 ENCOUNTER — HOSPITAL ENCOUNTER (OUTPATIENT)
Dept: RADIOLOGY | Facility: CLINIC | Age: 70
Discharge: HOME | End: 2025-03-25
Payer: MEDICARE

## 2025-03-25 DIAGNOSIS — H91.8X3 ASYMMETRICAL HEARING LOSS: ICD-10-CM

## 2025-03-25 DIAGNOSIS — H93.12 TINNITUS OF LEFT EAR: ICD-10-CM

## 2025-03-25 PROCEDURE — 2550000001 HC RX 255 CONTRASTS: Performed by: NURSE PRACTITIONER

## 2025-03-25 PROCEDURE — A9575 INJ GADOTERATE MEGLUMI 0.1ML: HCPCS | Performed by: NURSE PRACTITIONER

## 2025-03-25 PROCEDURE — 70553 MRI BRAIN STEM W/O & W/DYE: CPT

## 2025-03-25 RX ORDER — GADOTERATE MEGLUMINE 376.9 MG/ML
14 INJECTION INTRAVENOUS
Status: COMPLETED | OUTPATIENT
Start: 2025-03-25 | End: 2025-03-25

## 2025-03-25 RX ADMIN — GADOTERATE MEGLUMINE 14 ML: 376.9 INJECTION INTRAVENOUS at 15:18

## 2025-06-12 ENCOUNTER — APPOINTMENT (OUTPATIENT)
Dept: PRIMARY CARE | Facility: CLINIC | Age: 70
End: 2025-06-12
Payer: MEDICARE

## 2025-06-12 VITALS
HEIGHT: 65 IN | OXYGEN SATURATION: 97 % | WEIGHT: 154 LBS | DIASTOLIC BLOOD PRESSURE: 61 MMHG | HEART RATE: 92 BPM | SYSTOLIC BLOOD PRESSURE: 144 MMHG | BODY MASS INDEX: 25.66 KG/M2

## 2025-06-12 DIAGNOSIS — H61.23 EXCESSIVE EAR WAX, BILATERAL: Primary | ICD-10-CM

## 2025-06-12 DIAGNOSIS — H91.93 BILATERAL HIGH FREQUENCY HEARING LOSS: ICD-10-CM

## 2025-06-12 DIAGNOSIS — H60.63 CHRONIC NON-INFECTIVE OTITIS EXTERNA OF BOTH EARS, UNSPECIFIED TYPE: ICD-10-CM

## 2025-06-12 PROCEDURE — 3008F BODY MASS INDEX DOCD: CPT | Performed by: INTERNAL MEDICINE

## 2025-06-12 PROCEDURE — 3077F SYST BP >= 140 MM HG: CPT | Performed by: INTERNAL MEDICINE

## 2025-06-12 PROCEDURE — 1126F AMNT PAIN NOTED NONE PRSNT: CPT | Performed by: INTERNAL MEDICINE

## 2025-06-12 PROCEDURE — 3078F DIAST BP <80 MM HG: CPT | Performed by: INTERNAL MEDICINE

## 2025-06-12 PROCEDURE — 1159F MED LIST DOCD IN RCRD: CPT | Performed by: INTERNAL MEDICINE

## 2025-06-12 PROCEDURE — 99213 OFFICE O/P EST LOW 20 MIN: CPT | Performed by: INTERNAL MEDICINE

## 2025-06-12 ASSESSMENT — PAIN SCALES - GENERAL: PAINLEVEL_OUTOF10: 0-NO PAIN

## 2025-06-12 ASSESSMENT — ENCOUNTER SYMPTOMS
OCCASIONAL FEELINGS OF UNSTEADINESS: 0
LOSS OF SENSATION IN FEET: 1
DEPRESSION: 0

## 2025-06-21 PROBLEM — H91.93 BILATERAL HIGH FREQUENCY HEARING LOSS: Status: ACTIVE | Noted: 2025-06-21

## 2025-06-21 ASSESSMENT — ENCOUNTER SYMPTOMS
RESPIRATORY NEGATIVE: 1
CARDIOVASCULAR NEGATIVE: 1
SORE THROAT: 0
SINUS PRESSURE: 0

## 2025-06-21 NOTE — PROGRESS NOTES
"Subjective   Patient ID: Doug Cheadle is a 70 y.o. male who presents for Follow-up (EAR CLEANING).  HPI  Requests ear cleaning of excessive wax  No acute ear symptoms, ENT evaluation 3/20/2025    Review of Systems   HENT:  Positive for hearing loss. Negative for ear discharge, ear pain, sinus pressure and sore throat.    Respiratory: Negative.     Cardiovascular: Negative.        Objective   Physical Exam  HENT:      Right Ear: Tympanic membrane, ear canal and external ear normal.      Left Ear: Tympanic membrane, ear canal and external ear normal.      Ears:      Comments: Moderate ceruminous debris adherent to external ear, removed with cotton swab applicator until clear bilateral  Cardiovascular:      Rate and Rhythm: Normal rate and regular rhythm.      Heart sounds: Normal heart sounds.   Pulmonary:      Effort: Pulmonary effort is normal.      Breath sounds: Normal breath sounds.   Neurological:      Mental Status: He is alert.   Psychiatric:         Mood and Affect: Mood normal.       /61 (BP Location: Left arm, Patient Position: Sitting, BP Cuff Size: Adult)   Pulse 92   Ht 1.651 m (5' 5\")   Wt 69.9 kg (154 lb)   SpO2 97%   BMI 25.63 kg/m²     Data  ENT consult 3/20/2025 reviewed, sensorineural hearing loss and left cerumen impaction  MRI IAC 3/25/2025 negative  Hearing test 3/11/2025 reviewed bilateral high-frequency hearing loss    Patient ID: Doug Cheadle is a 70 y.o. male.    Procedures    Bilateral external ears and ear canals cleared of excessive wax under direct vision with cotton tip applicator    Assessment/Plan     Problem List Items Addressed This Visit       Chronic non-infective otitis externa of both ears    Excessive ear wax, bilateral - Primary    Bilateral high frequency hearing loss          "

## 2025-10-02 ENCOUNTER — APPOINTMENT (OUTPATIENT)
Dept: PRIMARY CARE | Facility: CLINIC | Age: 70
End: 2025-10-02
Payer: MEDICARE

## 2026-01-07 ENCOUNTER — APPOINTMENT (OUTPATIENT)
Dept: PRIMARY CARE | Facility: CLINIC | Age: 71
End: 2026-01-07
Payer: MEDICARE